# Patient Record
Sex: MALE | Race: WHITE
[De-identification: names, ages, dates, MRNs, and addresses within clinical notes are randomized per-mention and may not be internally consistent; named-entity substitution may affect disease eponyms.]

---

## 2018-07-28 ENCOUNTER — HOSPITAL ENCOUNTER (EMERGENCY)
Dept: HOSPITAL 97 - ER | Age: 68
Discharge: HOME | End: 2018-07-28
Payer: MEDICARE

## 2018-07-28 VITALS — TEMPERATURE: 98.5 F

## 2018-07-28 VITALS — OXYGEN SATURATION: 96 % | DIASTOLIC BLOOD PRESSURE: 72 MMHG | SYSTOLIC BLOOD PRESSURE: 117 MMHG

## 2018-07-28 DIAGNOSIS — Z79.82: ICD-10-CM

## 2018-07-28 DIAGNOSIS — G20: ICD-10-CM

## 2018-07-28 DIAGNOSIS — I10: ICD-10-CM

## 2018-07-28 DIAGNOSIS — F31.9: ICD-10-CM

## 2018-07-28 DIAGNOSIS — Z88.8: ICD-10-CM

## 2018-07-28 DIAGNOSIS — F12.10: Primary | ICD-10-CM

## 2018-07-28 DIAGNOSIS — J44.9: ICD-10-CM

## 2018-07-28 LAB
ALBUMIN SERPL BCP-MCNC: 3.6 G/DL (ref 3.4–5)
ALCOHOL SERUM/PLASMA: < 3 MG/DL (ref ?–3)
ALP SERPL-CCNC: 70 U/L (ref 45–117)
ALT SERPL W P-5'-P-CCNC: 21 U/L (ref 12–78)
AST SERPL W P-5'-P-CCNC: 12 U/L (ref 15–37)
BUN BLD-MCNC: 16 MG/DL (ref 7–18)
GLUCOSE SERPLBLD-MCNC: 174 MG/DL (ref 74–106)
HCT VFR BLD CALC: 44.8 % (ref 39.6–49)
INR BLD: 1.04
LYMPHOCYTES # SPEC AUTO: 1.2 K/UL (ref 0.7–4.9)
MCH RBC QN AUTO: 32.8 PG (ref 27–35)
MCV RBC: 97 FL (ref 80–100)
METHAMPHET UR QL SCN: NEGATIVE
PMV BLD: 9.1 FL (ref 7.6–11.3)
POTASSIUM SERPL-SCNC: 3.9 MMOL/L (ref 3.5–5.1)
RBC # BLD: 4.62 M/UL (ref 4.33–5.43)
THC SERPL-MCNC: POSITIVE NG/ML
UA COMPLETE W REFLEX CULTURE PNL UR: (no result)

## 2018-07-28 PROCEDURE — 80329 ANALGESICS NON-OPIOID 1 OR 2: CPT

## 2018-07-28 PROCEDURE — 80307 DRUG TEST PRSMV CHEM ANLYZR: CPT

## 2018-07-28 PROCEDURE — 85730 THROMBOPLASTIN TIME PARTIAL: CPT

## 2018-07-28 PROCEDURE — 99284 EMERGENCY DEPT VISIT MOD MDM: CPT

## 2018-07-28 PROCEDURE — 36415 COLL VENOUS BLD VENIPUNCTURE: CPT

## 2018-07-28 PROCEDURE — 93005 ELECTROCARDIOGRAM TRACING: CPT

## 2018-07-28 PROCEDURE — 81015 MICROSCOPIC EXAM OF URINE: CPT

## 2018-07-28 PROCEDURE — 80076 HEPATIC FUNCTION PANEL: CPT

## 2018-07-28 PROCEDURE — 96360 HYDRATION IV INFUSION INIT: CPT

## 2018-07-28 PROCEDURE — 85610 PROTHROMBIN TIME: CPT

## 2018-07-28 PROCEDURE — 81003 URINALYSIS AUTO W/O SCOPE: CPT

## 2018-07-28 PROCEDURE — 80048 BASIC METABOLIC PNL TOTAL CA: CPT

## 2018-07-28 PROCEDURE — 85025 COMPLETE CBC W/AUTO DIFF WBC: CPT

## 2018-07-28 PROCEDURE — 96361 HYDRATE IV INFUSION ADD-ON: CPT

## 2018-07-28 PROCEDURE — 80320 DRUG SCREEN QUANTALCOHOLS: CPT

## 2018-07-28 NOTE — XMS REPORT
Summary of Care: 18 - 18

 Created on:2019



Patient:JUAN JOSE PEREZ

Sex:Male

:1950

External Reference #:[id number]





Demographics







 Address  [address] [place] [address]

 

 Home Phone  [phone]

 

 Email Address  NONE

 

 Preferred Language  English

 

 Marital Status  Unknown

 

 Bahai Affiliation  None

 

 Race  White

 

 Additional Race(s)  Unavailable

 

 Ethnic Group  Not  or 









Author







 Organization  [facility]

 

 Address  [address], [place] [address]-

 

 Phone  [phone]









Encounter

HQ Encntr_alias(FIN) [id number] Date(s): 18 - 18

[address] [address]-     [place] [phone]
[phone]

Encounter Diagnosis

Parkinson's disease (Final) - 18

Discharge Disposition: Home or Self Care

Attending Physician: Reid Guerrier MD





Vital Signs

No data available for this section



Problem List







 Condition  Effective Dates  Status  Health Status  Informant

 

 Hyperlipidemia(Confirmed)    Active    

 

 Hypertension(Confirmed)    Active    

 

 Parkinsons(Confirmed)    Active    

 

 Pituitary mass(Confirmed)    Resolved    







Allergies, Adverse Reactions, Alerts







 Substance  Reaction  Severity  Status

 

 NKDA      Active







Medications

No data available for this section



Results

No data available for this section



Immunizations

No data available for this section



Procedures

No data available for this section



Social History







 Social History Type  Response

 

 Substance Abuse  Use: Current.  Type: Marijuana.  Recreational Drug Route:



   Inhaled.  Amount: 2 JOINTS A DAY.  Frequency: Daily.

 

 Smoking Status  Never smoker; Exposure to Tobacco Smoke None; Cigarette 
Smoking Last 365 Days No; Reg Smoking Cessation Counseling No



   entered on: 18







Assessment and Plan

No data available for this section

## 2018-07-28 NOTE — ER
Nurse's Notes                                                                                     

 [facility]                                                                

Name: Tung Yoo                                                                             

Age: 68 yrs                                                                                       

Sex: Male                                                                                         

: 1950                                                                                   

MRN: [id number]                                                                                   

Arrival Date: 2018                                                                          

Time: 16:13                                                                                       

Account#: [id number]                                                                            

Bed 8                                                                                             

Private MD:                                                                                       

Diagnosis: Cannabis abuse;Parkinson's disease                                                     

                                                                                                  

Presentation:                                                                                     

                                                                                             

16:15 Presenting complaint: EMS states: Pt called for lift assist and then wanted his vitals  jl7 

      checked. Pt was drooling and reported home health has not been to see him in a week.        

      Transition of care: patient was not received from another setting of care. Onset of         

      symptoms was 2018. Risk Assessment: Do you want to hurt yourself or someone        

      else? Patient reports no desire to harm self or others. Initial Sepsis Screen: Does the     

      patient meet any 2 criteria? No. Patient's initial sepsis screen is negative. Does the      

      patient have a suspected source of infection? No. Patient's initial sepsis screen is        

      negative. Care prior to arrival: None.                                                      

16:15 Method Of Arrival: EMS: [facility]                                                    [facility] 

16:15 Acuity: TANIA 3                                                                           jl7 

                                                                                                  

Triage Assessment:                                                                                

16:21 General: Appears in no apparent distress. uncomfortable, Behavior is calm, cooperative. jl7 

      Pain: Complains of pain in left arm Pain began years ago. Unable to use pain scale.         

      Does not appear to understand pain scale. EENT: No signs and/or symptoms were reported      

      regarding the EENT system. Neuro: Level of Consciousness is awake, alert, obeys             

      commands, confused, Oriented to person, place, time, situation. Cardiovascular: Heart       

      tones S1 S2 present Patient's skin is warm and dry. Respiratory: Airway is patent           

      Respiratory effort is even, unlabored, Respiratory pattern is regular, symmetrical. GI:     

      No signs and/or symptoms were reported involving the gastrointestinal system. : No        

      signs and/or symptoms were reported regarding the genitourinary system. Derm: Skin is       

      pink, warm \T\ dry. Musculoskeletal: No signs and/or symptoms reported regarding the        

      musculoskeletal system.                                                                     

                                                                                                  

Historical:                                                                                       

- Allergies:                                                                                      

16:21 Juan Jose Medications;                                                                       jl7 

16:21 NKDA;                                                                                   jl7 

- Home Meds:                                                                                      

16:38 acetaminophen-codeine 300-30 mg Oral tab [Active]; diazepam 10 mg Oral tab 1 tab 2      ph  

      times per day [Active]; ranitidine HCl 300 mg Oral tab 1 tab nightly [Active];              

      lisinopril 2.5 mg Oral tab 1 tab once daily [Active]; temazepam 30 mg Oral cap 1 cap        

      nightly [Active]; ropinirole 1 mg oral tab 1 tab 3 times per day [Active]; valacyclovir     

      1 gram Oral tab 1 tab 2 times per day [Active]; atorvastatin 20 mg oral tab 1 tab once      

      daily [Active]; buspirone 5 mg Oral tab 1 tab 2 times per day [Active]; aspirin 81 mg       

      Oral chew 1 tab once daily [Active]; Symbicort 160-4.5 mcg/actuation inhalation HFAA 2      

      puffs 2 times per day [Active]; carisoprodol 350 mg Oral tab 1 tab 3 times per day          

      [Active]; carbidopa-levodopa  mg Oral TbDL 1 tab 3 times per day [Active];            

- PMHx:                                                                                           

16:21 Arthritis; bipolar depression; COPD; CAD; HERPES; Hypertension; insomnia; Parkinsons;   jl7 

- PSHx:                                                                                           

16:21 None;                                                                                   jl7 

                                                                                                  

- Immunization history:: Adult Immunizations unknown.                                             

- Social history:: Smoking status: unknown.                                                       

- Ebola Screening: : No symptoms or risks identified at this time.                                

                                                                                                  

                                                                                                  

Screenin:26 Abuse screen: Denies threats or abuse. Denies injuries from another. Nutritional        jl7 

      screening: No deficits noted. Tuberculosis screening: No symptoms or risk factors           

      identified. Fall Risk IV access (20 points). Ambulatory Aid- Furniture (30 pts.). Gait-     

      Weak (10 pts.). Mental Status- Overestimates/Forgets Limitations (15 pts.). Total Silver     

      Fall Scale indicates High Risk Score (45 or more points). Fall prevention measures have     

      been instituted. Side Rails Up X 2 Placed Close to Nursing Station Frequent                 

      Obs/Assessments Occuring As available patient and family educated on Fall Prevention        

      Program and Strategies.                                                                     

                                                                                                  

Assessment:                                                                                       

16:15 General: See triage assessment.                                                         jl7 

16:45 General: Pt attempted to provide a urine sample, states "I haven't drank anything all   jl7 

      day. I can't go right now." Provider notified, ordered 1000 ml NS bolus IV..                

17:17 Reassessment: Patient and/or family updated on plan of care and expected duration. Pain jl7 

      level reassessed. Patient is alert, oriented x 3, equal unlabored respirations, skin        

      warm/dry/pink.                                                                              

18:15 Reassessment: No changes from previously documented assessment. Patient and/or family   jl7 

      updated on plan of care and expected duration. Pain level reassessed. Patient is alert,     

      oriented x 3, equal unlabored respirations, skin warm/dry/pink.                             

19:43 General: Appears in no apparent distress. Behavior is calm, cooperative. Neuro: Level   lp1 

      of Consciousness is awake, alert, obeys commands. Respiratory: Respiratory effort is        

      even, unlabored. Derm: Skin is pink, warm \T\ dry.                                          

19:49 Reassessment: Pt verbalized understanding of discharge instructions, need for follow    tl2 

      up. Family took pt to car with wheelchair.                                                  

                                                                                                  

Vital Signs:                                                                                      

16:21  / 77; Pulse 77; Resp 20 S; Temp 98.5(O); Pulse Ox 97% on R/A;                    jl7 

17:15  / 73; Pulse 67; Resp 16; Pulse Ox 95% ;                                          jl7 

18:30  / 69; Pulse 76; Resp 18; Pulse Ox 95% on R/A;                                    jl7 

19:30  / 72; Pulse 71; Resp 20; Pulse Ox 96% on R/A;                                    lp1 

                                                                                                  

ED Course:                                                                                        

16:13 Patient arrived in ED.                                                                  jl7 

16:16 Eleuterio Diaz NP is PHCP.                                                           pm1 

16:16 Scott Pepe MD is Attending Physician.                                              pm1 

16:19 Triage completed.                                                                       jl7 

16:21 Arm band placed on right wrist.                                                         jl7 

16:26 Patient has correct armband on for positive identification. Placed in gown. Bed in low  jl7 

      position. Call light in reach. Side rails up X 1. Cardiac monitor on. Pulse ox on. NIBP     

      on. Warm blanket given.                                                                     

16:30 Initial lab(s) drawn, by me, sent to lab. Inserted saline lock: 20 gauge in right       jl7 

      wrist, using aseptic technique. Blood collected.                                            

16:40 Rinku Castro RN is Primary Nurse.                                                      jl7 

17:30 EKG done, by ED staff, reviewed by Eleuterio Diaz NP.                                  cb2 

19:04 Report given to ROBB Hernandez.                                                              jl7 

19:44 No provider procedures requiring assistance completed. IV discontinued, No              lp1 

      redness/swelling at site. Pressure dressing applied.                                        

                                                                                                  

Administered Medications:                                                                         

16:55 Drug: NS 0.9% 1000 ml Route: IV; Rate: 1 bolus; Site: right wrist;                      jl7 

19:45 Follow up: IV Status: Completed infusion; IV Intake: 1000ml                             lp1 

                                                                                                  

                                                                                                  

Intake:                                                                                           

19:45 IV: 1000ml; Total: 1000ml.                                                              lp1 

                                                                                                  

Outcome:                                                                                          

19:22 Discharge ordered by MD.                                                                pm1 

19:44 Discharged to home via wheelchair, with friend.                                         lp1 

19:44 Condition: stable                                                                           

19:44 Discharge instructions given to patient, Instructed on discharge instructions, follow       

      up and referral plans. Demonstrated understanding of instructions, follow-up care.          

19:50 Patient left the ED.                                                                    tl2 

                                                                                                  

Signatures:                                                                                       

Mary Dorantes RN                         RN   lp1                                                  

Veronica Dennis RN                      RN   Eleuterio Goodwin, PRICILLA                    NP   pm1                                                  

Jody Perry RN RN   tl2                                                  

Rinku Castro RN                        RN   jl7                                                  

Kush Chisholm                                                  

                                                                                                  

**************************************************************************************************

## 2018-07-28 NOTE — XMS REPORT
Continuity of Care Document

 Created on:2018



Patient:JUAN JOSE PEREZ

Sex:Male

:1950

External Reference #:[id number]





Demographics







 Address  [address] [place] [address]

 

 Phone  [phone]

 

 Preferred Language  Unknown

 

 Marital Status  Unknown

 

 Taoist Affiliation  Unknown

 

 Race  Unknown

 

 Ethnic Group  Unknown









Author







 Organization  [facility]









Problems







 Problem  Status  Onset  Classification  Date  Comments  Source



     Date    Reported    



             



             



             

 

 Parkinson's    20    [address]



             



             

 

 G20  Active  20        [address]



             



             

 

 Hyperlipidemia  Active    Problem  2018    [facility]



             



             

 

 Hypertension  Active    Problem  2018    [facility]



             



             

 

 Parkinsons  Active    Problem  2018    [facility]



             



             

 

 Pituitary mass  Resolved    Problem  2018    [facility]



             



             







Medications







 Medication  Details  Route  Status  Patient  Ordering  Order  Source



         Instructions  Provider  Date  



               



               



               







Allergies, Adverse Reactions, Alerts







 Substance  Category  Reaction  Severity  Reaction  Status  Date  Comments  
Source



         type    Reported    



                 



                 



                 

 

 NKDA  Assertion      Drug  Active      [facility]



                 



                 







Immunizations







 Immunization  Date Given  Site  Status  Last Updated  Comments  Source



             



             







Results







 Order  Results  Value  Reference  Date  Interpretation  Comments  Source



 Name      Range        



               



               



               

 

 Brain  Brain  EXAM: NM Brain Imaging SPECT        -  [facility]



 scan  scan      /    -  Medical



 SPECT  SPECT NM          This report was dictated by a Radiology Resident/
Fellow.  I have personally reviewed the images as  Center



 NM            well as the Resident's interpretation and agree with the 
findings.  



     DATE: 2018 9:15 AM CST        Read by:  Vonda Galvan MD    
        Resident:  Vonda Galvan MD  



             Dictated Date/time:  18 15:35  



             Electronically Signed by:  Val Webb MD                       
   18 16:55  



             FINAL REPORT  



     INDICATION: Left-sided tremor, evaluate for Parkinson disease          



               



               



               



     COMPARISON: None          



               



               



               



     TECHNIQUE:          



               



               



               



     After intravenous administration of 5 mCi of  I-123 Ioflupane, delayed 
SPECT images of the head were obtained at 4 hours post injection.          



               



               



               



     FINDINGS:          



               



               



               



     Right striatum:          



               



               



               



     There is markedly decreased tracer uptake in the right putamen with a Z 
score of -3.3  (Z scores represent standard deviation from normal age match 
population and are significant if less than -1.6).          



               



     Moderately decreased tracer uptake in the right caudate with a Z score of -
2.8.          



               



     The right striatum contributes 37% of the remaining dopamine transporter 
function with a Z score of -3.2.          



               



               



               



     Left striatum:          



               



               



               



     There is decreased tracer uptake in the left putamen with a Z score of -
2.9.          



               



     Tracer uptake is mildly decreased  in the left caudate with a Z score of -
2.4.          



               



     The left striatum contributes 63% of the remaining dopamine transporter 
function with a Z score of -2.8 .          



               



               



               



     The remainder of tracer distribution in background brain parenchyma is 
within normal limits.          



               



               



               



     IMPRESSION:          



               



               



               



     The scan findings are suggestive of dysfunction of the dopamine 
transporters in the bilateral striata, right worse than left,  likely due to 
Parkinson disease.          



               



               



               



               







Vital Signs







 Vital Sign  Value  Date  Comments  Source



         







Encounters







 Location  Location  Encounter  Encounter  Reason  Attending  ADM  DC  Status  
Source



   Details  Type  Number  For  Provider  Date  Date    



         Visit          



                   



                   



                   

 

 [facility]  [id number]    Reid       Myra Guerrier  /2018    [facility]



                   



                   



                   







Procedures







 Procedure  Code  Date  Perfomer  Comments  Source

## 2018-07-28 NOTE — EDPHYS
Physician Documentation                                                                           

 [facility]                                                                

Name: Tung Yoo                                                                             

Age: 68 yrs                                                                                       

Sex: Male                                                                                         

: 1950                                                                                   

MRN: [id number]                                                                                   

Arrival Date: 2018                                                                          

Time: 16:13                                                                                       

Account#: [id number]                                                                            

Bed 8                                                                                             

Private MD:                                                                                       

ED Physician Scott Pepe                                                                       

HPI:                                                                                              

                                                                                             

17:00 This 68 yrs old  Male presents to ER via EMS with complaints of General        pm1 

      Weakness.                                                                                   

17:00 The patient's problem is reported as weakness, Legs. Onset: The symptoms/episode        pm1 

      began/occurred 1 year(s) ago. Duration: The episode is continuous. Associated signs and     

      symptoms: Pertinent negatives: abdominal pain, blurred vision, chest pain, dizziness,       

      nausea, numbness, shortness of breath, vomiting. The patient has not recently seen a        

      physician, the patient's primary care provider is Dr. Nj. Patient with Hx of               

      Parkinson's disease. He called the EMS for lift assistance with getting out of his          

      current chair and into another. After assistance, the patient was able to walk to           

      restroom without any difficulty. He is complaining of generalized weakness to his legs      

      which has been going on for 1 year. Patient without any other complaints.                   

                                                                                                  

Historical:                                                                                       

- Allergies:                                                                                      

16:21 Juan Jose Medications;                                                                       jl7 

16:21 NKDA;                                                                                   jl7 

- Home Meds:                                                                                      

16:38 acetaminophen-codeine 300-30 mg Oral tab [Active]; diazepam 10 mg Oral tab 1 tab 2      ph  

      times per day [Active]; ranitidine HCl 300 mg Oral tab 1 tab nightly [Active];              

      lisinopril 2.5 mg Oral tab 1 tab once daily [Active]; temazepam 30 mg Oral cap 1 cap        

      nightly [Active]; ropinirole 1 mg oral tab 1 tab 3 times per day [Active]; valacyclovir     

      1 gram Oral tab 1 tab 2 times per day [Active]; atorvastatin 20 mg oral tab 1 tab once      

      daily [Active]; buspirone 5 mg Oral tab 1 tab 2 times per day [Active]; aspirin 81 mg       

      Oral chew 1 tab once daily [Active]; Symbicort 160-4.5 mcg/actuation inhalation HFAA 2      

      puffs 2 times per day [Active]; carisoprodol 350 mg Oral tab 1 tab 3 times per day          

      [Active]; carbidopa-levodopa  mg Oral TbDL 1 tab 3 times per day [Active];            

- PMHx:                                                                                           

16:21 Arthritis; bipolar depression; COPD; CAD; HERPES; Hypertension; insomnia; Parkinsons;   jl7 

- PSHx:                                                                                           

16:21 None;                                                                                   jl7 

                                                                                                  

- Immunization history:: Adult Immunizations unknown.                                             

- Social history:: Smoking status: unknown.                                                       

- Ebola Screening: : No symptoms or risks identified at this time.                                

                                                                                                  

                                                                                                  

ROS:                                                                                              

17:00 Constitutional: Negative for fever, chills, and weight loss, Eyes: Negative for injury, pm1 

      pain, redness, and discharge, ENT: Negative for injury, pain, and discharge, Neck:          

      Negative for injury, pain, and swelling, Cardiovascular: Negative for chest pain,           

      palpitations, and edema, Respiratory: Negative for shortness of breath, cough,              

      wheezing, and pleuritic chest pain, Abdomen/GI: Negative for abdominal pain, nausea,        

      vomiting, diarrhea, and constipation, Back: Negative for injury and pain, MS/Extremity:     

      Negative for injury and deformity, Skin: Negative for injury, rash, and discoloration.      

17:00 Neuro: Positive for weakness, Negative for dizziness, numbness, tingling.                   

                                                                                                  

Exam:                                                                                             

17:00 Constitutional:  This is a well developed, well nourished patient who is awake, alert,  pm1 

      and in no acute distress. Head/Face:  Normocephalic, atraumatic. Eyes:  Pupils equal        

      round and reactive to light, extra-ocular motions intact.  Lids and lashes normal.          

      Conjunctiva and sclera are non-icteric and not injected.  Cornea within normal limits.      

      Periorbital areas with no swelling, redness, or edema. ENT:  Nares patent. No nasal         

      discharge, no septal abnormalities noted.  Tympanic membranes are normal and external       

      auditory canals are clear.  Oropharynx with no redness, swelling, or masses, exudates,      

      or evidence of obstruction, uvula midline.  Mucous membranes moist. Neck:  Trachea          

      midline, no thyromegaly or masses palpated, and no cervical lymphadenopathy.  Supple,       

      full range of motion without nuchal rigidity, or vertebral point tenderness.  No            

      Meningismus. Chest/axilla:  Normal chest wall appearance and motion.  Nontender with no     

      deformity.  No lesions are appreciated. Cardiovascular:  Regular rate and rhythm with a     

      normal S1 and S2.  No gallops, murmurs, or rubs.  Normal PMI, no JVD.  No pulse             

      deficits. Respiratory:  Lungs have equal breath sounds bilaterally, clear to                

      auscultation and percussion.  No rales, rhonchi or wheezes noted.  No increased work of     

      breathing, no retractions or nasal flaring. Abdomen/GI:  Soft, non-tender, with normal      

      bowel sounds.  No distension or tympany.  No guarding or rebound.  No evidence of           

      tenderness throughout. Back:  No spinal tenderness.  No costovertebral tenderness.          

      Full range of motion. Skin:  Warm, dry with normal turgor.  Normal color with no            

      rashes, no lesions, and no evidence of cellulitis. MS/ Extremity:  Pulses equal, no         

      cyanosis.  Neurovascular intact.  Full, normal range of motion.                             

17:00 Neuro: Orientation: is normal, Mentation: is normal, Cranial nerves: CN II- XII are         

      normal as tested, Motor: moves all fours, strength is normal, strength is 5/5 in all        

      extremities, Sensation: is normal, no obvious gross deficits, Abnormal movements: pill      

      rolling tremor, is noted.                                                                   

                                                                                                  

Vital Signs:                                                                                      

16:21  / 77; Pulse 77; Resp 20 S; Temp 98.5(O); Pulse Ox 97% on R/A;                    jl7 

17:15  / 73; Pulse 67; Resp 16; Pulse Ox 95% ;                                          jl7 

18:30  / 69; Pulse 76; Resp 18; Pulse Ox 95% on R/A;                                    jl7 

19:30  / 72; Pulse 71; Resp 20; Pulse Ox 96% on R/A;                                    lp1 

                                                                                                  

MDM:                                                                                              

16:16 Patient medically screened.                                                             pm1 

19:11 Data reviewed: vital signs. Data interpreted: Pulse oximetry: on room air is 95 %.      pm1 

      Interpretation: normal.                                                                     

19:19 Counseling: I had a detailed discussion with the patient and/or guardian regarding: the pm1 

      historical points, exam findings, and any diagnostic results supporting the                 

      discharge/admit diagnosis, the need for outpatient follow up, to return to the              

      emergency department if symptoms worsen or persist or if there are any questions or         

      concerns that arise at home.                                                                

                                                                                                  

                                                                                             

16:24 Order name: Acetaminophen; Complete Time: 18:43                                         pm1 

                                                                                             

16:24 Order name: Basic Metabolic Panel; Complete Time: 18:43                                 pm1 

                                                                                             

16:24 Order name: CBC with Diff; Complete Time: 17:04                                         pm1 

                                                                                             

16:24 Order name: ETOH Level; Complete Time: 18:43                                            pm                                                                                             

16:24 Order name: Hepatic Function; Complete Time: 18:43                                      pm                                                                                             

16:24 Order name: PT-INR; Complete Time: 17:14                                                pm                                                                                             

16:24 Order name: Ptt, Activated; Complete Time: 17:14                                        pm                                                                                             

16:24 Order name: Salicylate; Complete Time: 18:43                                            pm                                                                                             

16:24 Order name: Urine Drug Screen; Complete Time: 18:43                                     pm                                                                                             

16:24 Order name: EKG; Complete Time: 16:25                                                   pm                                                                                             

16:24 Order name: EKG - Nurse/Tech; Complete Time: 17:26                                      pm                                                                                             

16:24 Order name: Urine Microscopic Only; Complete Time: 18:43                                pm                                                                                             

17:50 Order name: Urine Dipstick--Ancillary (enter results); Complete Time: 18:43               

                                                                                             

16:24 Order name: IV Saline Lock; Complete Time: 16:41                                        pm                                                                                             

16:24 Order name: Labs collected and sent; Complete Time: 16:41                               pm                                                                                             

16:24 Order name: Urine Dipstick-Ancillary (obtain specimen); Complete Time: 19:10            pm1 

                                                                                                  

Administered Medications:                                                                         

16:55 Drug: NS 0.9% 1000 ml Route: IV; Rate: 1 bolus; Site: right wrist;                      jl7 

19:45 Follow up: IV Status: Completed infusion; IV Intake: 1000ml                             lp1 

                                                                                                  

                                                                                                  

Disposition:                                                                                      

                                                                                             

07:10 Co-signature as Attending Physician, Scott Pepe MD I agree with the assessment and   kdr 

      plan of care.                                                                               

                                                                                                  

Disposition:                                                                                      

18 19:22 Discharged to Home. Impression: Cannabis abuse, Parkinson's disease.               

- Condition is Stable.                                                                            

- Discharge Instructions: Cannabis Use Disorder, Parkinson Disease.                               

                                                                                                  

- Medication Reconciliation Form, Thank You Letter, Antibiotic Education, Prescription            

  Opioid Use form.                                                                                

- Follow up: Emergency Department; When: As needed; Reason: Worsening of condition.               

  Follow up: Private Physician; When: 2 - 3 days; Reason: Recheck today's complaints,             

  Continuance of care, Re-evaluation by your physician.                                           

- Problem is new.                                                                                 

- Symptoms have improved.                                                                         

                                                                                                  

                                                                                                  

                                                                                                  

Signatures:                                                                                       

Dispatcher [employer]                           EDMS                                                 

Scott Pepe MD MD kdr Hall, Patricia RN                      RN   ph                                                   

Eleuterio Diaz, PRICILLA                    NP   pm1                                                  

Jody Perry RN                        RN   tl2                                                  

Rinku Castro RN                        RN   jl7                                                  

Mary Dorantes RN   lp1                                                  

                                                                                                  

Corrections: (The following items were deleted from the chart)                                    

                                                                                             

19:22 19:22 2018 19:22 Discharged to Home. Impression: Cannabis abuse. Condition is     pm1 

      Stable. Forms are Medication Reconciliation Form, Thank You Letter, Antibiotic              

      Education, Prescription Opioid Use. Follow up: Emergency Department; When: As needed;       

      Reason: Worsening of condition. Follow up: Private Physician; When: 2 - 3 days; Reason:     

      Recheck today's complaints, Continuance of care, Re-evaluation by your physician.           

      Problem is new. Symptoms have improved. pm1                                                 

19:50 19:22 2018 19:22 Discharged to Home. Impression: Cannabis abuse; Parkinson's      tl2 

      disease. Condition is Stable. Forms are Medication Reconciliation Form, Thank You           

      Letter, Antibiotic Education, Prescription Opioid Use. Follow up: Emergency Department;     

      When: As needed; Reason: Worsening of condition. Follow up: Private Physician; When: 2      

      - 3 days; Reason: Recheck today's complaints, Continuance of care, Re-evaluation by         

      your physician. Problem is new. Symptoms have improved. pm1                                 

                                                                                                  

**************************************************************************************************

## 2018-07-29 NOTE — EKG
Test Date:    2018-07-28               Test Time:    17:25:34

Technician:   SANTANA                                     

                                                     

MEASUREMENT RESULTS:                                       

Intervals:                                           

Rate:         68                                     

UT:           166                                    

QRSD:         78                                     

QT:           400                                    

QTc:          425                                    

Axis:                                                

P:            53                                     

UT:           166                                    

QRS:          -17                                    

T:            39                                     

                                                     

INTERPRETIVE STATEMENTS:                                       

                                                     

Normal sinus rhythm

Normal ECG

Compared to ECG 09/18/2017 21:12:39

No significant changes



Electronically Signed On 07-29-18 09:31:50 CDT by Chavo Rodriguez

## 2018-08-17 ENCOUNTER — HOSPITAL ENCOUNTER (EMERGENCY)
Dept: HOSPITAL 97 - ER | Age: 68
Discharge: HOME | End: 2018-08-17
Payer: MEDICARE

## 2018-08-17 VITALS — DIASTOLIC BLOOD PRESSURE: 70 MMHG | OXYGEN SATURATION: 99 % | TEMPERATURE: 98 F | SYSTOLIC BLOOD PRESSURE: 160 MMHG
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
## 2018-08-17 DIAGNOSIS — M54.5: Primary | ICD-10-CM

## 2018-08-17 DIAGNOSIS — J44.9: ICD-10-CM
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
## 2018-08-17 DIAGNOSIS — I10: ICD-10-CM
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
## 2018-08-17 DIAGNOSIS — Z95.818: ICD-10-CM

## 2018-08-17 DIAGNOSIS — G20: ICD-10-CM

## 2018-08-17 DIAGNOSIS — Z88.6: ICD-10-CM

## 2018-08-17 DIAGNOSIS — Z79.82: ICD-10-CM

## 2018-08-17 PROCEDURE — 81003 URINALYSIS AUTO W/O SCOPE: CPT

## 2018-08-17 PROCEDURE — 72131 CT LUMBAR SPINE W/O DYE: CPT

## 2018-08-17 PROCEDURE — 72070 X-RAY EXAM THORAC SPINE 2VWS: CPT

## 2018-08-17 PROCEDURE — 96372 THER/PROPH/DIAG INJ SC/IM: CPT

## 2018-08-17 PROCEDURE — 99284 EMERGENCY DEPT VISIT MOD MDM: CPT

## 2018-08-17 NOTE — ER
Nurse's Notes                                                                                     

 [facility]                                                                

Name: Tung Yoo                                                                             

Age: 68 yrs                                                                                       

Sex: Male                                                                                         

: 1950                                                                                   

MRN: [id number]                                                                                   

Arrival Date: 2018                                                                          

Time: 03:20                                                                                       

Account#: [id number]                                                                            

Bed 15                                                                                            

Private MD:                                                                                       

Diagnosis: Fall due to bumping against object;Low back pain;Parkinson's disease                   

                                                                                                  

Presentation:                                                                                     

                                                                                             

03:13 Presenting complaint: EMS states: "Patient c/o back pain since 4pm yesterday.".         bs1 

03:13 Transition of care: Patient has [facility]. Onset of symptoms was  at 16:00. Risk Assessment: Do you want to hurt yourself or someone else? Patient       

      reports no desire to harm self or others. Initial Sepsis Screen: Does the patient meet      

      any 2 criteria? No. Patient's initial sepsis screen is negative. Does the patient have      

      a suspected source of infection? No. Patient's initial sepsis screen is negative. Care      

      prior to arrival: None.                                                                     

03:13 Method Of Arrival: EMS: [facility]                                                    bs1 

03:13 Acuity: TANIA 4                                                                           bs1 

                                                                                                  

Historical:                                                                                       

- Allergies:                                                                                      

03:25 Juan Jose Medications;                                                                       bs1 

- Home Meds:                                                                                      

03:25 acetaminophen-codeine 300-30 mg Oral tab [Active]; aspirin 81 mg Oral chew 1 tab once   bs1 

      daily [Active]; atorvastatin 20 mg Oral tab 1 tab once daily [Active]; buspirone 5 mg       

      Oral tab 1 tab 2 times per day [Active]; carbidopa-levodopa  mg Oral TbDL 1 tab 3     

      times per day [Active]; carisoprodol 350 mg Oral tab 1 tab 3 times per day [Active];        

      diazepam 10 mg Oral tab 1 tab 2 times per day [Active]; lisinopril 2.5 mg Oral tab 1        

      tab once daily [Active]; ranitidine HCl 300 mg Oral tab 1 tab nightly [Active];             

      ropinirole 1 mg Oral tab 1 tab 3 times per day [Active]; Symbicort 160-4.5                  

      mcg/actuation inhalation HFAA 2 puffs 2 times per day [Active]; temazepam 30 mg Oral        

      cap 1 cap nightly [Active]; valacyclovir 1 gram Oral tab 1 tab 2 times per day [Active];    

- PMHx:                                                                                           

03:25 Arthritis; bipolar depression; CAD; COPD; HERPES; Hypertension; insomnia; Parkinsons;   bs1 

- PSHx:                                                                                           

03:25 Heart stents;                                                                           bs1 

                                                                                                  

- Immunization history:: Adult Immunizations up to date.                                          

- Social history:: Smoking status: Patient/guardian denies using tobacco.                         

- Ebola Screening: : Patient negative for fever greater than or equal to 101.5 degrees            

  Fahrenheit, and additional compatible Ebola Virus Disease symptoms Patient denies               

  exposure to infectious person.                                                                  

- Family history:: not pertinent.                                                                 

                                                                                                  

                                                                                                  

Screenin:53 Abuse screen: Denies threats or abuse. Denies injuries from another. Nutritional        bs1 

      screening: No deficits noted. Tuberculosis screening: No symptoms or risk factors           

      identified. Fall Risk None identified.                                                      

                                                                                                  

Assessment:                                                                                       

03:27 General: Appears in no apparent distress. uncomfortable, Behavior is calm, cooperative, bs1 

      appropriate for age. Pain: Complains of pain in right lower back Pain does not radiate.     

      Neuro: Level of Consciousness is awake, alert, obeys commands, Oriented to person,          

      place, time, situation. Cardiovascular: Denies chest pain, shortness of breath, Heart       

      tones S1 S2 present Capillary refill < 3 seconds Patient's skin is warm and dry.            

      Respiratory: Airway is patent Trachea midline Respiratory effort is even, unlabored,        

      Respiratory pattern is regular, symmetrical, Breath sounds are clear bilaterally. GI:       

      No signs and/or symptoms were reported involving the gastrointestinal system. : No        

      signs and/or symptoms were reported regarding the genitourinary system. EENT: No signs      

      and/or symptoms were reported regarding the EENT system. Derm: Skin is intact. Derm:        

      Skin is pink, warm \T\ dry. normal. Musculoskeletal: Circulation, motion, and sensation     

      intact. Capillary refill < 3 seconds, Range of motion: intact in all extremities,           

      Reports pain in right lower back.                                                           

05:00 Reassessment: Patient appears in no apparent distress at this time. Patient and/or      bs1 

      family updated on plan of care and expected duration. Pain level reassessed. Patient is     

      alert, oriented x 3, equal unlabored respirations, skin warm/dry/pink. Updated patient      

      on POC/pending xray results.                                                                

06:50 Reassessment: Patient appears in no apparent distress at this time. Patient and/or      bs1 

      family updated on plan of care and expected duration. Pain level reassessed. Patient is     

      alert, oriented x 3, equal unlabored respirations, skin warm/dry/pink. Patient states       

      understanding of discharge instructions. Patient denies pain at this time. Patient          

      states feeling better. Patient states symptoms have improved.                               

                                                                                                  

Vital Signs:                                                                                      

03:13  / 81; Pulse 74; Resp 16; Temp 98.7(O); Pulse Ox 95% on R/A; Weight 86.18 kg;     bs1 

      Height 5 ft. 7 in. (170.18 cm); Pain 8/10;                                                  

04:22  / 75; Pulse 61; Resp 16; Pulse Ox 94% on R/A;                                    mt  

05:22  / 72; Pulse 68; Resp 17; Pulse Ox 100% on R/A;                                   bs1 

06:30  / 70; Pulse 74; Resp 16; Temp 98(O); Pulse Ox 99% ; Pain 0/10;                   bs1 

03:13 Body Mass Index 29.76 (86.18 kg, 170.18 cm)                                             bs1 

                                                                                                  

ED Course:                                                                                        

03:20 Patient arrived in ED.                                                                  bs1 

03:22 Triage completed.                                                                       bs1 

03:30 Ed Bales MD is Attending Physician.                                             otilio 

03:30 Patient has correct armband on for positive identification. Bed in low position. Call   bs1 

      light in reach. Side rails up X 1. Pulse ox on. NIBP on.                                    

04:21 Radha Koenig, RN is Primary Nurse.                                                 bs1 

04:48 CT Lumbar Spine Wo Con In Process Unspecified.                                          EDMS

04:54 Arm band placed on right wrist.                                                         bs1 

05:17 CT completed. Patient tolerated procedure well. Patient moved to CT via stretcher.        

      Patient moved to radiology via stretcher.                                                   

05:17 Patient moved back from CT.                                                               

05:23 X-ray completed. Patient tolerated procedure well.                                        

05:26 Spine Thoracic Ap/Lat XRAY In Process Unspecified.                                      EDMS

07:04 No provider procedures requiring assistance completed. Patient did not have IV access   bs1 

      during this emergency room visit.                                                           

                                                                                                  

Administered Medications:                                                                         

04:31 Drug: Norco 10 mg-325 mg 1 tabs Route: PO;                                              bs1 

04:55 Follow up: Response: No adverse reaction                                                bs1 

04:32 Drug: TORadol 60 mg Route: IM; Site: right deltoid;                                     bs1 

04:55 Follow up: Response: No adverse reaction                                                bs1 

                                                                                                  

                                                                                                  

Outcome:                                                                                          

05:30 Discharge ordered by MD.                                                                otilio 

07:04 Discharged to [facility] called patients friend for a ride home.             bs1 

07:04 Condition: stable                                                                           

07:04 Discharge instructions given to patient, Instructed on discharge instructions, follow       

      up and referral plans. medication usage, Demonstrated understanding of instructions,        

      follow-up care, medications, Prescriptions given X 2.                                       

07:07 Patient left the ED.                                                                    bs1 

                                                                                                  

Signatures:                                                                                       

Dispatcher [employer]                           EDEd Rosario MD MD cha Hagler, Connie Zuñiga Moriah mt Salazar, Brittany, RN                   RN   bs1                                                  

                                                                                                  

**************************************************************************************************

## 2018-08-17 NOTE — EDPHYS
Physician Documentation                                                                           

 [facility]                                                                

Name: Tung Yoo                                                                             

Age: 68 yrs                                                                                       

Sex: Male                                                                                         

: 1950                                                                                   

MRN: [id number]                                                                                   

Arrival Date: 2018                                                                          

Time: 03:20                                                                                       

Account#: [id number]                                                                            

Bed 15                                                                                            

Private MD:                                                                                       

ED Physician Ed Bales                                                                      

HPI:                                                                                              

                                                                                             

04:16 This 68 yrs old  Male presents to ER via EMS with complaints of Back Pain.     otilio 

04:16 The patient presents with pain that is acute. The symptoms are located in the low back, otilio 

      lumbar area and sacrum. Onset: The symptoms/episode began/occurred just prior to            

      arrival. The pain does not radiate. Associated signs and symptoms: The patient has no       

      apparent associated signs or symptoms. The problem was sustained during a fall, while       

      standing. Modifying factors: The patient symptoms are alleviated by remaining still,        

      rest, the patient symptoms are aggravated by any movement. Severity of symptoms: At         

      their worst the symptoms were moderate, in the emergency department the symptoms are        

      unchanged. The patient has not experienced similar symptoms in the past.                    

                                                                                                  

Historical:                                                                                       

- Allergies:                                                                                      

03:25 Juan Jose Medications;                                                                       bs1 

- Home Meds:                                                                                      

03:25 acetaminophen-codeine 300-30 mg Oral tab [Active]; aspirin 81 mg Oral chew 1 tab once   bs1 

      daily [Active]; atorvastatin 20 mg Oral tab 1 tab once daily [Active]; buspirone 5 mg       

      Oral tab 1 tab 2 times per day [Active]; carbidopa-levodopa  mg Oral TbDL 1 tab 3     

      times per day [Active]; carisoprodol 350 mg Oral tab 1 tab 3 times per day [Active];        

      diazepam 10 mg Oral tab 1 tab 2 times per day [Active]; lisinopril 2.5 mg Oral tab 1        

      tab once daily [Active]; ranitidine HCl 300 mg Oral tab 1 tab nightly [Active];             

      ropinirole 1 mg Oral tab 1 tab 3 times per day [Active]; Symbicort 160-4.5                  

      mcg/actuation inhalation HFAA 2 puffs 2 times per day [Active]; temazepam 30 mg Oral        

      cap 1 cap nightly [Active]; valacyclovir 1 gram Oral tab 1 tab 2 times per day [Active];    

- PMHx:                                                                                           

03:25 Arthritis; bipolar depression; CAD; COPD; HERPES; Hypertension; insomnia; Parkinsons;   bs1 

- PSHx:                                                                                           

03:25 Heart stents;                                                                           bs1 

                                                                                                  

- Immunization history:: Adult Immunizations up to date.                                          

- Social history:: Smoking status: Patient/guardian denies using tobacco.                         

- Ebola Screening: : Patient negative for fever greater than or equal to 101.5 degrees            

  Fahrenheit, and additional compatible Ebola Virus Disease symptoms Patient denies               

  exposure to infectious person.                                                                  

- Family history:: not pertinent.                                                                 

                                                                                                  

                                                                                                  

ROS:                                                                                              

04:16 Constitutional: Negative for fever, chills, and weight loss, Eyes: Negative for injury, otilio 

      pain, redness, and discharge, ENT: Negative for injury, pain, and discharge, Neck:          

      Negative for injury, pain, and swelling, Cardiovascular: Negative for chest pain,           

      palpitations, and edema, Respiratory: Negative for shortness of breath, cough,              

      wheezing, and pleuritic chest pain, Abdomen/GI: Negative for abdominal pain, nausea,        

      vomiting, diarrhea, and constipation, : Negative for injury, bleeding, discharge, and     

      swelling, MS/Extremity: Negative for injury and deformity, Skin: Negative for injury,       

      rash, and discoloration, Neuro: Negative for headache, weakness, numbness, tingling,        

      and seizure, Psych: Negative for depression, anxiety, suicide ideation, homicidal           

      ideation, and hallucinations, Allergy/Immunology: Negative for hives, rash, and             

      allergies, Endocrine: Negative for neck swelling, polydipsia, polyuria, polyphagia, and     

      marked weight changes.                                                                      

04:16 Back: Positive for decreased range of motion, pain at rest, pain with movement, of the      

      lumbar area and sacrum.                                                                     

                                                                                                  

Exam:                                                                                             

04:16 Constitutional:  This is a well developed, well nourished patient who is awake, alert,  otilio 

      and in no acute distress. Head/Face:  Normocephalic, atraumatic. Eyes:  Pupils equal        

      round and reactive to light, extra-ocular motions intact.  Lids and lashes normal.          

      Conjunctiva and sclera are non-icteric and not injected.  Cornea within normal limits.      

      Periorbital areas with no swelling, redness, or edema. ENT:  Nares patent. No nasal         

      discharge, no septal abnormalities noted.  Tympanic membranes are normal and external       

      auditory canals are clear.  Oropharynx with no redness, swelling, or masses, exudates,      

      or evidence of obstruction, uvula midline.  Mucous membranes moist. Neck:  Trachea          

      midline, no thyromegaly or masses palpated, and no cervical lymphadenopathy.  Supple,       

      full range of motion without nuchal rigidity, or vertebral point tenderness.  No            

      Meningismus. Chest/axilla:  Normal chest wall appearance and motion.  Nontender with no     

      deformity.  No lesions are appreciated. Cardiovascular:  Regular rate and rhythm with a     

      normal S1 and S2.  No gallops, murmurs, or rubs.  Normal PMI, no JVD.  No pulse             

      deficits. Respiratory:  Lungs have equal breath sounds bilaterally, clear to                

      auscultation and percussion.  No rales, rhonchi or wheezes noted.  No increased work of     

      breathing, no retractions or nasal flaring. Abdomen/GI:  Soft, non-tender, with normal      

      bowel sounds.  No distension or tympany.  No guarding or rebound.  No evidence of           

      tenderness throughout. Male :  Normal genitalia with no discharge or lesions. Skin:       

      Warm, dry with normal turgor.  Normal color with no rashes, no lesions, and no evidence     

      of cellulitis. MS/ Extremity:  Pulses equal, no cyanosis.  Neurovascular intact.  Full,     

      normal range of motion. Neuro:  Awake and alert, GCS 15, oriented to person, place,         

      time, and situation.  Cranial nerves II-XII grossly intact.  Motor strength 5/5 in all      

      extremities.  Sensory grossly intact.  Cerebellar exam normal.  Normal gait. Psych:         

      Awake, alert, with orientation to person, place and time.  Behavior, mood, and affect       

      are within normal limits.                                                                   

04:16 Back: pain, that is mild, ROM is painful, normal spinal alignment noted, CVA                

      tenderness, is absent, muscle spasm, is not present.                                        

                                                                                                  

Vital Signs:                                                                                      

03:13  / 81; Pulse 74; Resp 16; Temp 98.7(O); Pulse Ox 95% on R/A; Weight 86.18 kg;     bs1 

      Height 5 ft. 7 in. (170.18 cm); Pain 8/10;                                                  

04:22  / 75; Pulse 61; Resp 16; Pulse Ox 94% on R/A;                                    mt  

05:22  / 72; Pulse 68; Resp 17; Pulse Ox 100% on R/A;                                   bs1 

06:30  / 70; Pulse 74; Resp 16; Temp 98(O); Pulse Ox 99% ; Pain 0/10;                   bs1 

03:13 Body Mass Index 29.76 (86.18 kg, 170.18 cm)                                             bs1 

                                                                                                  

MDM:                                                                                              

03:31 Patient medically screened.                                                             [facility] 

04:18 Data reviewed: vital signs, nurses notes, lab test result(s), urinalysis, radiologic    [facility] 

      studies, CT scan, plain films.                                                              

                                                                                                  

                                                                                             

06:06 Order name: Urine Dipstick--Ancillary (enter results)                                   ms  

                                                                                             

04:16 Order name: CT Lumbar Spine Wo Con                                                      [facility] 

                                                                                             

04:16 Order name: Urine Dipstick-Ancillary (obtain specimen); Complete Time: 05:59            [facility] 

                                                                                             

04:16 Order name: Spine Thoracic Ap/Lat XRAY                                                  [facility] 

                                                                                                  

Administered Medications:                                                                         

04:31 Drug: Norco 10 mg-325 mg 1 tabs Route: PO;                                              bs1 

04:55 Follow up: Response: No adverse reaction                                                bs1 

04:32 Drug: TORadol 60 mg Route: IM; Site: right deltoid;                                     bs1 

04:55 Follow up: Response: No adverse reaction                                                bs1 

                                                                                                  

                                                                                                  

Disposition:                                                                                      

18 05:30 Discharged to Home. Impression: Fall due to bumping against object, Low back       

  pain, Parkinson's disease.                                                                      

- Condition is Stable.                                                                            

- Discharge Instructions: Back Pain, Adult, Chronic Back Pain, Musculoskeletal Pain,              

  Back Injury Prevention, Easy-to-Read, Back Pain, Adult, Easy-to-Read.                           

- Prescriptions for Tylenol- Codeine #3 300-30 mg Oral Tablet - take 2 tablets by ORAL            

  route every 6 hours As needed; 26 tablet. Motrin  mg Oral Tablet - take 2                 

  tablet by ORAL route every 6 hours As needed as needed with food; 30 tablet.                    

- Medication Reconciliation Form, Thank You Letter, Antibiotic Education, Prescription            

  Opioid Use form.                                                                                

- Follow up: Private Physician; When: 2 - 3 days; Reason: Recheck today's complaints,             

  Continuance of care, Re-evaluation by your physician.                                           

- Problem is new.                                                                                 

- Symptoms have improved.                                                                         

                                                                                                  

                                                                                                  

                                                                                                  

Signatures:                                                                                       

Dispatcher [employer]                           EDEd Rosario MD MD cha Salazar, Brittany, RN                   RN   bs1                                                  

                                                                                                  

Corrections: (The following items were deleted from the chart)                                    

07:07 05:30 2018 05:30 Discharged to Home. Impression: Fall due to bumping against      bs1 

      object; Low back pain; Parkinson's disease. Condition is Stable. Discharge                  

      Instructions: Back Pain, Adult, Chronic Back Pain, Musculoskeletal Pain, Back Injury        

      Prevention, Easy-to-Read, Back Pain, Adult, Easy-to-Read. Prescriptions for                 

      Tylenol-Codeine #3 300-30 mg Oral Tablet - take 2 tablets by ORAL route every 6 hours       

      As needed; 26 tablet, Motrin  mg Oral Tablet - take 2 tablet by ORAL route every      

      6 hours As needed as needed with food; 30 tablet. and Forms are Medication                  

      Reconciliation Form, Thank You Letter, Antibiotic Education, Prescription Opioid Use.       

      Follow up: Private Physician; When: 2 - 3 days; Reason: Recheck today's complaints,         

      Continuance of care, Re-evaluation by your physician. Problem is new. Symptoms have         

      improved. otilio                                                                               

                                                                                                  

**************************************************************************************************

## 2018-08-17 NOTE — RAD REPORT
EXAM DESCRIPTION:  RAD - Thoracic Spine Ap/Lat - 8/17/2018 5:26 am

 

CLINICAL HISTORY:  PAIN

Radiculopathy

 

COMPARISON:  No comparisons

 

FINDINGS:  The thoracic spine vertebral body heights and disc spaces are largely maintained. No acute
 compression fracture. No significant malalignment. Prominent bridging anterior osteophytosis of the 
thoracic spine.

 

IMPRESSION:  Prominent bridging anterior osteophytosis of the thoracic spine.

## 2018-08-17 NOTE — RAD REPORT
EXAM DESCRIPTION:  CT - Spine Lumbar Wo Con - 8/17/2018 7:05 am

 

CLINICAL HISTORY:   Radiculopathy.

PAIN

 

COMPARISON:  Spine Lumbar Wo Con dated 1/28/2018

 

TECHNIQUE:  Axial noncontrast CT imaging of the lumbar spine was performed with coronal and sagittal 
re-formatted images.

 

All CT scans are performed using dose optimization technique as appropriate and may include automated
 exposure control or mA/KV adjustment according to patient size.

 

FINDINGS:  No acute lumbar spine fracture seen. No aggressive marrow pattern or malalignment. Mild ch
ronic compression deformity of T11 vertebral body seen anteriorly. Chronic deformity from prior fract
ure right L1 transverse process also seen.

 

Paraspinal tissues are normal in thickness. No paraspinal abscess or hematoma seen.

 

Posterior disc bulges are present at the lower lumbar levels.

 

IMPRESSION:  No acute lumbar spine finding is seen.

 

Consider MRI follow-up for assessment of disc disease if clinically desired.

## 2018-08-17 NOTE — XMS REPORT
Continuity of Care Document

 Created on:2018



Patient:JUAN JOSE PEREZ

Sex:Male

:1950

External Reference #:[id number]





Demographics







 Address  [address] [place] [address]

 

 Phone  [phone]

 

 Preferred Language  Unknown

 

 Marital Status  Unknown

 

 Rastafarian Affiliation  Unknown

 

 Race  Unknown

 

 Ethnic Group  Unknown









Author







 Organization  [employer]









Problems







 Problem  Status  Onset  Classification  Date  Comments  Source



     Date    Reported    



             



             



             

 

 Parkinson's    20    [address]



             



             

 

 G20  Active  20        [address]



             



             

 

 Hyperlipidemia  Active    Problem  2018    [facility]



             



             

 

 Hypertension  Active    Problem  2018    [facility]



             



             

 

 Parkinsons  Active    Problem  2018    [facility]



             



             

 

 Pituitary mass  Resolved    Problem  2018    [facility]



             



             







Medications







 Medication  Details  Route  Status  Patient  Ordering  Order  Source



         Instructions  Provider  Date  



               



               



               







Allergies, Adverse Reactions, Alerts







 Substance  Category  Reaction  Severity  Reaction  Status  Date  Comments  
Source



         type    Reported    



                 



                 



                 

 

 NKDA  Assertion      Drug  Active      [facility]



                 



                 







Immunizations







 Immunization  Date Given  Site  Status  Last Updated  Comments  Source



             



             







Results







 Order  Results  Value  Reference  Date  Interpretation  Comments  Source



 Name      Range        



               



               



               

 

 Brain  Brain  EXAM: NM Brain Imaging SPECT        -  [facility]



 scan  scan      /    -  Medical



 SPECT  SPECT NM          This report was dictated by a Radiology Resident/
Fellow.  I have personally reviewed the images as  Center



 NM            well as the Resident's interpretation and agree with the 
findings.  



     DATE: 2018 9:15 AM CST        Read by:  Vonda Galvan MD    
        Resident:  Vonda Galvan MD  



             Dictated Date/time:  18 15:35  



             Electronically Signed by:  Val Webb MD                       
   18 16:55  



             FINAL REPORT  



     INDICATION: Left-sided tremor, evaluate for Parkinson disease          



               



               



               



     COMPARISON: None          



               



               



               



     TECHNIQUE:          



               



               



               



     After intravenous administration of 5 mCi of  I-123 Ioflupane, delayed 
SPECT images of the head were obtained at 4 hours post injection.          



               



               



               



     FINDINGS:          



               



               



               



     Right striatum:          



               



               



               



     There is markedly decreased tracer uptake in the right putamen with a Z 
score of -3.3  (Z scores represent standard deviation from normal age match 
population and are significant if less than -1.6).          



               



     Moderately decreased tracer uptake in the right caudate with a Z score of -
2.8.          



               



     The right striatum contributes 37% of the remaining dopamine transporter 
function with a Z score of -3.2.          



               



               



               



     Left striatum:          



               



               



               



     There is decreased tracer uptake in the left putamen with a Z score of -
2.9.          



               



     Tracer uptake is mildly decreased  in the left caudate with a Z score of -
2.4.          



               



     The left striatum contributes 63% of the remaining dopamine transporter 
function with a Z score of -2.8 .          



               



               



               



     The remainder of tracer distribution in background brain parenchyma is 
within normal limits.          



               



               



               



     IMPRESSION:          



               



               



               



     The scan findings are suggestive of dysfunction of the dopamine 
transporters in the bilateral striata, right worse than left,  likely due to 
Parkinson disease.          



               



               



               



               







Vital Signs







 Vital Sign  Value  Date  Comments  Source



         







Encounters







 Location  Location  Encounter  Encounter  Reason  Attending  ADM  DC  Status  
Source



   Details  Type  Number  For  Provider  Date  Date    



         Visit          



                   



                   



                   

 

 [facility]  [id number]    Reid       Myra Guerrier  /2018    [facility]



                   



                   



                   







Procedures







 Procedure  Code  Date  Perfomer  Comments  Source

## 2018-10-24 ENCOUNTER — HOSPITAL ENCOUNTER (EMERGENCY)
Dept: HOSPITAL 97 - ER | Age: 68
Discharge: HOME | End: 2018-10-24
Payer: MEDICARE

## 2018-10-24 VITALS — TEMPERATURE: 98.1 F

## 2018-10-24 VITALS — SYSTOLIC BLOOD PRESSURE: 140 MMHG | DIASTOLIC BLOOD PRESSURE: 76 MMHG

## 2018-10-24 VITALS — OXYGEN SATURATION: 98 %

## 2018-10-24 DIAGNOSIS — Z95.818: ICD-10-CM

## 2018-10-24 DIAGNOSIS — G20: ICD-10-CM

## 2018-10-24 DIAGNOSIS — Z88.8: ICD-10-CM

## 2018-10-24 DIAGNOSIS — I10: ICD-10-CM

## 2018-10-24 DIAGNOSIS — Z71.1: Primary | ICD-10-CM

## 2018-10-24 PROCEDURE — 70450 CT HEAD/BRAIN W/O DYE: CPT

## 2018-10-24 PROCEDURE — 99284 EMERGENCY DEPT VISIT MOD MDM: CPT

## 2018-10-24 NOTE — XMS REPORT
Continuity of Care Document

 Created on:2018



Patient:JUAN JOSE PEREZ

Sex:Male

:1950

External Reference #:[id number]





Demographics







 Address  [address] [place] [address]

 

 Phone  [phone]

 

 Preferred Language  Unknown

 

 Marital Status  Unknown

 

 Restoration Affiliation  Unknown

 

 Race  Unknown

 

 Ethnic Group  Unknown









Author







 Organization  [facility]









Problems







 Problem  Status  Onset  Classification  Date  Comments  Source



     Date    Reported    



             



             



             

 

 Parkinson's    20    [address]



             



             

 

 G20  Active  20        [address]



             



             

 

 Hyperlipidemia  Active    Problem  2018    [facility]



             



             

 

 Hypertension  Active    Problem  2018    [facility]



             



             

 

 Parkinsons  Active    Problem  2018    [facility]



             



             

 

 Pituitary mass  Resolved    Problem  2018    [facility]



             



             







Medications







 Medication  Details  Route  Status  Patient  Ordering  Order  Source



         Instructions  Provider  Date  



               



               



               







Allergies, Adverse Reactions, Alerts







 Substance  Category  Reaction  Severity  Reaction  Status  Date  Comments  
Source



         type    Reported    



                 



                 







Immunizations







 Immunization  Date Given  Site  Status  Last Updated  Comments  Source



             



             







Results







 Order  Results  Value  Reference  Date  Interpretation  Comments  Source



 Name      Range        



               



               



               

 

 Brain  Brain  EXAM: NM Brain Imaging SPECT        -  [facility]



 scan  scan      /    -  Medical



 SPECT  SPECT NM          This report was dictated by a Radiology Resident/
Fellow.  I have personally reviewed the images as  Center



 NM            well as the Resident's interpretation and agree with the 
findings.  



     DATE: 2018 9:15 AM CST        Read by:  Vonda Galvan MD    
        Resident:  Vonda Galvan MD  



             Dictated Date/time:  18 15:35  



             Electronically Signed by:  Val Webb MD                       
   18 16:55  



             FINAL REPORT  



     INDICATION: Left-sided tremor, evaluate for Parkinson disease          



               



               



               



     COMPARISON: None          



               



               



               



     TECHNIQUE:          



               



               



               



     After intravenous administration of 5 mCi of  I-123 Ioflupane, delayed 
SPECT images of the head were obtained at 4 hours post injection.          



               



               



               



     FINDINGS:          



               



               



               



     Right striatum:          



               



               



               



     There is markedly decreased tracer uptake in the right putamen with a Z 
score of -3.3  (Z scores represent standard deviation from normal age match 
population and are significant if less than -1.6).          



               



     Moderately decreased tracer uptake in the right caudate with a Z score of -
2.8.          



               



     The right striatum contributes 37% of the remaining dopamine transporter 
function with a Z score of -3.2.          



               



               



               



     Left striatum:          



               



               



               



     There is decreased tracer uptake in the left putamen with a Z score of -
2.9.          



               



     Tracer uptake is mildly decreased  in the left caudate with a Z score of -
2.4.          



               



     The left striatum contributes 63% of the remaining dopamine transporter 
function with a Z score of -2.8 .          



               



               



               



     The remainder of tracer distribution in background brain parenchyma is 
within normal limits.          



               



               



               



     IMPRESSION:          



               



               



               



     The scan findings are suggestive of dysfunction of the dopamine 
transporters in the bilateral striata, right worse than left,  likely due to 
Parkinson disease.          



               



               



               



               







Vital Signs







 Vital Sign  Value  Date  Comments  Source



         







Encounters







 Location  Location  Encounter  Encounter  Reason  Attending  ADM  DC  Status  
Source



   Details  Type  Number  For  Provider  Date  Date    



         Visit          



                   



                   



                   

 

 [facility]  [id number]    [place]       Myra Guerrier  /2018    [facility]



                   



                   



                   







Procedures







 Procedure  Code  Date  Perfomer  Comments  Source

## 2018-10-24 NOTE — ER
Nurse's Notes                                                                                     

 [facility]                                                                

Name: Tung Yoo                                                                             

Age: 68 yrs                                                                                       

Sex: Male                                                                                         

: 1950                                                                                   

MRN: [id number]                                                                                   

Arrival Date: 10/24/2018                                                                          

Time: 09:04                                                                                       

Account#: [id number]                                                                            

Bed 7                                                                                             

Private MD:                                                                                       

Diagnosis: Person with feared health complaint in whom no diagnosis is made                       

                                                                                                  

Presentation:                                                                                     

10/24                                                                                             

09:04 Presenting complaint: EMS states: Feels like there is a hole in the back of his head x  hb  

      20 years. Denies pain. Transition of care: patient was not received from another            

      setting of care. Onset of symptoms is unknown. Care prior to arrival: None.                 

09:04 Method Of Arrival: EMS: [facility]                                                    hb  

09:04 Acuity: TANIA 4                                                                           hb  

10:06 Risk Assessment: Do you want to hurt yourself or someone else? Patient reports no       sv  

      desire to harm self or others. Initial Sepsis Screen: Does the patient meet any 2           

      criteria? No. Patient's initial sepsis screen is negative. Does the patient have a          

      suspected source of infection? No. Patient's initial sepsis screen is negative.             

                                                                                                  

Historical:                                                                                       

- Allergies:                                                                                      

09:06 Juan Jose Medications;                                                                       hb  

- PMHx:                                                                                           

09:50 Arthritis; bipolar depression; CAD; COPD; HERPES; Hypertension; insomnia; Parkinsons;   sg  

- PSHx:                                                                                           

09:50 Heart stents;                                                                           sg  

                                                                                                  

- Immunization history:: Adult Immunizations up to date.                                          

- Social history:: Smoking status: Patient/guardian denies using tobacco.                         

- Ebola Screening: : No symptoms or risks identified at this time.                                

                                                                                                  

                                                                                                  

Screenin:10 Abuse screen: Denies threats or abuse. Denies injuries from another. Nutritional        sg  

      screening: No deficits noted. Tuberculosis screening: No symptoms or risk factors           

      identified. Never had TB. Fall Risk None identified.                                        

                                                                                                  

Assessment:                                                                                       

09:10 General: Appears in no apparent distress. well groomed, well developed, well nourished, sg  

      Behavior is calm, cooperative, appropriate for age. Pain: Denies pain. Pain does not        

      radiate. Quality of pain is described as tingling, to back of head, reports is not          

      painful, is just tingling intermittently and has been happening for about 20 yrs.           

      Neuro: Level of Consciousness is awake, alert, obeys commands, Oriented to person,          

      place, time, situation,  are equal bilaterally Moves all extremities. Full             

      function reports tremors to BUE,BLE that are not new. Speech is normal, Facial symmetry     

      appears normal. Cardiovascular: Capillary refill is brisk in bilateral fingers              

      Patient's skin is warm and dry. Chest pain is denied. Respiratory: Airway is patent         

      Respiratory effort is even, unlabored, Respiratory pattern is regular, symmetrical. GI:     

      Abdomen is round non-distended, Reports normal bowel habits, tolerance of fluids,           

      tolerance of food. : No signs and/or symptoms were reported regarding the                 

      genitourinary system. EENT: No signs and/or symptoms were reported regarding the EENT       

      system. Derm: Skin is pink, warm \T\ dry. Musculoskeletal: No signs and/or symptoms         

      reported regarding the musculoskeletal system.                                              

                                                                                                  

Vital Signs:                                                                                      

09:04  / 80; Pulse 64; Resp 16; Temp 98.1; Pulse Ox 100% on R/A; Pain 0/10;             hb  

09:54 Pulse 60; Resp 16 S; Pulse Ox 98% on R/A; Pain 0/10;                                    sg  

09:58  / 76;                                                                            sv  

                                                                                                  

Hellen Coma Score:                                                                               

09:47 Eye Response: spontaneous(4). Verbal Response: oriented(5). Motor Response: obeys       kb  

      commands(6). Total: 15.                                                                     

                                                                                                  

ED Course:                                                                                        

09:04 Patient arrived in ED.                                                                  sg  

09:04 Sherwin Coates, ROBB is Primary Nurse.                                                       sg  

09:05 [facility]                                                        kb  

09:05 Scott Pepe MD is Attending Physician.                                              kb  

09:05 Triage completed.                                                                       hb  

09:09 CT Head Brain wo Cont In Process Unspecified.                                           EDMS

09:09 CT completed. Patient tolerated procedure well. Patient moved to CT via stretcher.      [id number] 

      Patient moved back from CT.                                                                 

09:10 Patient has correct armband on for positive identification. Bed in low position. Call   sg  

      light in reach. Side rails up X2. Pulse ox on. NIBP on. Warm blanket given. Head of bed     

      elevated.                                                                                   

09:59 No provider procedures requiring assistance completed. Patient did not have IV access   sv  

      during this emergency room visit.                                                           

10:06 Arm band placed on.                                                                     sv  

                                                                                                  

Administered Medications:                                                                         

No medications were administered                                                                  

                                                                                                  

                                                                                                  

Outcome:                                                                                          

09:49 Discharge ordered by MD.                                                                kb  

10:05 Discharged to home ambulatory, with his cane                                            sv  

10:05 Condition: stable                                                                           

10:05 Discharge instructions given to patient, Instructed on discharge instructions, follow       

      up and referral plans. Demonstrated understanding of instructions, follow-up care.          

10:06 Patient left the ED.                                                                    sv  

                                                                                                  

Signatures:                                                                                       

Dispatcher [employer]                           EDMaribel Anderson, DARIELA SOLANO-Lsia Rivera RN RN sv Gay, Steven, RN RN sg Baxter, Heather, RN RN hb Garcia, Jessica                              [id number]                                                  

                                                                                                  

**************************************************************************************************

## 2018-10-24 NOTE — XMS REPORT
Summary of Care

 Created on:2018



Patient:JUAN JOSE PEREZ

Sex:Male

:1950

External Reference #:[id number]





Demographics







 Address  [address] [place] [id number]

 

 Phone  Unavailable

 

 Preferred Language  English

 

 Marital Status  Unknown

 

 [employer] Affiliation  Unknown

 

 Race  White

 

 Ethnic Group  Not  or 









Author







 Name  PRATIBHA PATHAK M.D.

 

 Address  [facility]



   Unavailable



   ,









Care Team Providers







 Name  Role  Phone

 

 LAWSON BUSTAMANTE, PRATIBHA  Unavailable  Unavailable

 

 Lana Leija M.A.  Unavailable  Unavailable

 

 CARRIE , SHAGGY MERAZ CHASE  Unavailable  Unavailable

 

 Unavailable  Unavailable  Unavailable









Functional Status







 Name  Dates  Details

 

 Functional status health issues are not documented    Status:









 Name  Dates  Details

 

 Cognitive status health issues are not documented    Status:







Problems







 Name  Dates  Details

 

 Parkinson disease, symptomatic (332.0, G20)    Status: Active

 

 Dysarthria (784.51, R47.1)    Status: Active







Medications







 Name  Dates  Details









 Aspirin 81 MG TABS



 TAKE 1 TABLET DAILY.









    Refills: 0





Active

Temazepam 30 MG Oral Capsule

TAKE 1 CAPSULE AT BEDTIME.







  Refills: 0





Active

ValACYclovir HCl - 1 GM Oral Tablet

TAKE 1 TABLET DAILY.







  Refills: 0





Active

DiazePAM 10 MG Oral Tablet

TAKE 1 TABLET DAILY.







  Refills: 0





Active

RaNITidine HCl - 150 MG Oral Tablet

TAKE 1 TABLET EVERY 12 HOURS AS NEEDED.







  Refills: 0





Active

Atorvastatin Calcium 20 MG Oral Tablet

TAKE 1 TABLET BEDTIME







  Quantity: 90   Refills: 0





Active

Tylenol with Codeine #3 300-30 MG Oral Tablet

TAKE 1 TABLET BY MOUTH TWICE DAILY AS NEEDED FOR PAIN







  Quantity: 60   Refills: 1





Active

Carisoprodol 350 MG Oral Tablet

Take 1 tablet three times a day







  Refills: 0





Active

Budesonide 1 MG/2ML Inhalation Suspension

USE AS DIRECTED.







  Refills: 0





Active

Carbidopa-Levodopa  MG Oral Tablet

TAKE 1 TABLET 3 TIMES DAILY.







  Quantity: 90   Refills: 0







Pricilla PATHAK M.D.tive

Lisinopril 2.5 MG Oral Tablet

TAKE 1 TABLET DAILY.







  Refills: 0





Active

Carbidopa-Levodopa  MG Oral Tablet

TAKE AS DIRECTED







  Quantity: 90   Refills: 4







PRATIBHA PATHAK M.D.





  Start : 19-Sep-2018



Active





Allergies and Adverse Reactions







 Name  Dates  Details

 

 No Known Drug Allergies (Allergy)    Status: Active







Procedures







 Procedure  Dates  Details

 

 Speech-Language Therapy  Date: 19-Sep-2018  

 

 Physical Therapy  Date: 19-Sep-2018  







Immunization







 Name  Dates  Details

 

 Immunizations not documented    







Social History







 Name  Dates  Details

 

 Unknown if ever smoked    







Vital Signs







 Date  Test  Result  Details

 

       

 

 19-Sep-201814:30  BP Systolic  132 mm[Hg]  Status: Comments: Location: [facility]; 
Position: Sitting









 BP Diastolic  71 mm[Hg]  Status: Comments: Location: E; Position: Sitting

 

 Height  67 in  Status:

 

 Weight  219 lb  Status:

 

 Body Mass Index Calculated  34.3 kg/m2  Status:

 

 Body Surface Area Calculated  2.1 m2  Status:

 

 Temperature  97.9 f  Status:

 

 Heart Rate  67 /min  Status:







Results







 Date  Description  Value  Details

 

   Results not documented    



       

 

       







Plan of Care







 Name  Dates  Details









 Planned Observations









 Planned Goals not documented    









 Planned Encounters









 Appointment; PRATIBHA PATHAK M.D.  On: 2019 13:00







Interventions Provided

Medication ChangesCarbidopa-Levodopa  MG Oral Tablet - RenewCarbidopa-
Levodopa  MG Oral Tablet - Renew



Instructions







 Name  Dates  Details

 

 Instructions not documented    







Encounters







 Appointment; PRATIBHA PATHAK M.D.  On: 19-Sep-2018 15:00



 Encounter Diagnosis: Problem not documented

## 2018-10-24 NOTE — EDPHYS
Physician Documentation                                                                           

 [facility]                                                                

Name: Tung Yoo                                                                             

Age: 68 yrs                                                                                       

Sex: Male                                                                                         

: 1950                                                                                   

MRN: [id number]                                                                                   

Arrival Date: 10/24/2018                                                                          

Time: 09:04                                                                                       

Account#: [id number]                                                                            

Bed 7                                                                                             

Private MD:                                                                                       

ED Physician Scott Pepe                                                                       

HPI:                                                                                              

10/24                                                                                             

09:44 This 68 yrs old  Male presents to ER via EMS with complaints of Tingling Back  kb  

      of Head x 20 yrs.                                                                           

09:44 The patient complains of pain to the left occipital area. The patient describes the     kb  

      headache as constant. Onset: The symptoms/episode began/occurred 20 year(s) ago.            

      Associated signs and symptoms: The patient has no apparent associated signs or              

      symptoms. Severity of symptoms: At its worst the pain was mild, in the emergency            

      department the pain is unchanged. Headache History: Denies prior headaches. The             

      symptoms are alleviated by nothing. the symptoms are aggravated by nothing. The patient     

      has not experienced similar symptoms in the past. The patient has not recently seen a       

      physician. Pt reports tingling to back of head for 20 years. States it feels like there     

      is a hole there, but it's not on the outside. .                                             

                                                                                                  

Historical:                                                                                       

- Allergies:                                                                                      

09:06 Juan Jose Medications;                                                                       hb  

- PMHx:                                                                                           

09:50 Arthritis; bipolar depression; CAD; COPD; HERPES; Hypertension; insomnia; Parkinsons;   sg  

- PSHx:                                                                                           

09:50 Heart stents;                                                                           sg  

                                                                                                  

- Immunization history:: Adult Immunizations up to date.                                          

- Social history:: Smoking status: Patient/guardian denies using tobacco.                         

- Ebola Screening: : No symptoms or risks identified at this time.                                

                                                                                                  

                                                                                                  

ROS:                                                                                              

09:44 Constitutional: Negative for fever, chills, and weight loss, Cardiovascular: Negative   kb  

      for chest pain, palpitations, and edema, Respiratory: Negative for shortness of breath,     

      cough, wheezing, and pleuritic chest pain, Abdomen/GI: Negative for abdominal pain,         

      nausea, vomiting, diarrhea, and constipation, Back: Negative for injury and pain,           

      MS/Extremity: Negative for injury and deformity, Skin: Negative for injury, rash, and       

      discoloration.                                                                              

09:44 Neuro: Positive for headache, tingling.                                                     

                                                                                                  

Exam:                                                                                             

09:44 Constitutional:  This is a well developed, well nourished patient who is awake, alert,  kb  

      and in no acute distress. Head/Face:  Normocephalic, atraumatic. Chest/axilla:  Normal      

      chest wall appearance and motion.  Nontender with no deformity.  No lesions are             

      appreciated. Cardiovascular:  Regular rate and rhythm with a normal S1 and S2.  No          

      gallops, murmurs, or rubs.  Normal PMI, no JVD.  No pulse deficits. Respiratory:  Lungs     

      have equal breath sounds bilaterally, clear to auscultation and percussion.  No rales,      

      rhonchi or wheezes noted.  No increased work of breathing, no retractions or nasal          

      flaring. Abdomen/GI:  Soft, non-tender, with normal bowel sounds.  No distension or         

      tympany.  No guarding or rebound.  No evidence of tenderness throughout. Back:  No          

      spinal tenderness.  No costovertebral tenderness.  Full range of motion. Skin:  Warm,       

      dry with normal turgor.  Normal color with no rashes, no lesions, and no evidence of        

      cellulitis. MS/ Extremity:  Pulses equal, no cyanosis.  Neurovascular intact.  Full,        

      normal range of motion. Neuro:  Awake and alert, GCS 15, oriented to person, place,         

      time, and situation.  Cranial nerves II-XII grossly intact.  Motor strength 5/5 in all      

      extremities.  Sensory grossly intact.  Cerebellar exam normal.  Normal gait.                

                                                                                                  

Vital Signs:                                                                                      

09:04  / 80; Pulse 64; Resp 16; Temp 98.1; Pulse Ox 100% on R/A; Pain 0/10;             hb  

09:54 Pulse 60; Resp 16 S; Pulse Ox 98% on R/A; Pain 0/10;                                    sg  

09:58  / 76;                                                                            sv  

                                                                                                  

[place] Coma Score:                                                                               

09:47 Eye Response: spontaneous(4). Verbal Response: oriented(5). Motor Response: obeys       kb  

      commands(6). Total: 15.                                                                     

                                                                                                  

MDM:                                                                                              

09:05 Patient medically screened.                                                             kb  

09:47 Data reviewed: vital signs, nurses notes. Data interpreted: Pulse oximetry: on room air kb  

      is 100 %. Interpretation: normal. Counseling: I had a detailed discussion with the          

      patient and/or guardian regarding: the historical points, exam findings, and any            

      diagnostic results supporting the discharge/admit diagnosis, radiology results, the         

      need for outpatient follow up, a family practitioner, to return to the emergency            

      department if symptoms worsen or persist or if there are any questions or concerns that     

      arise at home.                                                                              

                                                                                                  

10/24                                                                                             

09:05 Order name: CT Head Brain wo Cont; Complete Time: 09:21                                 kb  

                                                                                                  

Administered Medications:                                                                         

No medications were administered                                                                  

                                                                                                  

                                                                                                  

Disposition:                                                                                      

10/24/18 09:49 Discharged to Home. Impression: Person with feared health complaint in whom no     

  diagnosis is made.                                                                              

- Condition is Stable.                                                                            

                                                                                                  

                                                                                                  

- Medication Reconciliation Form, Thank You Letter, Antibiotic Education, Prescription            

  Opioid Use, Work release form form.                                                             

- Follow up: Private Physician; When: 2 - 3 days; Reason: Recheck today's complaints,             

  Continuance of care, Re-evaluation by your physician. Follow up: Emergency                      

  Department; When: As needed; Reason: Worsening of condition.                                    

                                                                                                  

                                                                                                  

                                                                                                  

Addendum:                                                                                         

2018                                                                                        

     07:59 Co-signature as Attending Physician, Scott Pepe MD I agree with the assessment and   k
dr

           plan of care.                                                                          

                                                                                                  

Signatures:                                                                                       

Dispatcher [employer]                           EDMS                                                 

Maribel Johnson, FNP-C                 FNP-Ckb                                                   

Lisa Mohr RN                    RN   Sherwin Sheffield RN RN sg Rittger, Kevin, MD MD   [facility]                                                  

Naina Slaughter RN                     RN                                                      

                                                                                                  

Corrections: (The following items were deleted from the chart)                                    

10/24                                                                                             

10:06 09:49 10/24/2018 09:49 Discharged to Home. Impression: Person with feared health        sv  

      complaint in whom no diagnosis is made. Condition is Stable. Forms are Medication           

      Reconciliation Form, Thank You Letter, Antibiotic Education, Prescription Opioid Use.       

      Follow up: Private Physician; When: 2 - 3 days; Reason: Recheck today's complaints,         

      Continuance of care, Re-evaluation by your physician. Follow up: Emergency Department;      

      When: As needed; Reason: Worsening of condition. kb                                         

                                                                                                  

**************************************************************************************************

## 2018-10-24 NOTE — RAD REPORT
EXAM DESCRIPTION:  CT - Head Brain Wo Cont - 10/24/2018 9:09 am

 

CLINICAL HISTORY:  Headache

 

COMPARISON:  July 2017

 

TECHNIQUE:  Computed axial tomography of the head was obtained. IV contrast was not requested.

 

All CT scans are performed using dose optimization technique as appropriate and may include automated
 exposure control or mA/KV adjustment according to patient size.

 

FINDINGS:  An intracranial  bleed is not seen .

 

The ventricles are normal in caliber.

 

No extra-axial fluid collection is noted.

 

Fluid within the sinuses/ mastoids is not seen. Mild to moderate chronic opacification ethmoid is see
n

 

IMPRESSION:  No acute intracranial abnormality is seen. If patient's symptoms persist  MRI of the bra
in would be recommended.

 

Mild-to-moderate chronic ethmoid sinusitis